# Patient Record
Sex: FEMALE | Race: WHITE | NOT HISPANIC OR LATINO
[De-identification: names, ages, dates, MRNs, and addresses within clinical notes are randomized per-mention and may not be internally consistent; named-entity substitution may affect disease eponyms.]

---

## 2017-06-15 ENCOUNTER — APPOINTMENT (OUTPATIENT)
Dept: PULMONOLOGY | Facility: CLINIC | Age: 72
End: 2017-06-15

## 2017-06-15 VITALS
HEART RATE: 67 BPM | DIASTOLIC BLOOD PRESSURE: 72 MMHG | OXYGEN SATURATION: 97 % | SYSTOLIC BLOOD PRESSURE: 160 MMHG | WEIGHT: 168 LBS | HEIGHT: 60 IN | BODY MASS INDEX: 32.98 KG/M2

## 2017-07-07 ENCOUNTER — APPOINTMENT (OUTPATIENT)
Dept: OPHTHALMOLOGY | Facility: CLINIC | Age: 72
End: 2017-07-07

## 2017-11-14 ENCOUNTER — APPOINTMENT (OUTPATIENT)
Dept: OPHTHALMOLOGY | Facility: CLINIC | Age: 72
End: 2017-11-14
Payer: MEDICARE

## 2017-11-14 PROCEDURE — 92083 EXTENDED VISUAL FIELD XM: CPT

## 2017-12-14 ENCOUNTER — APPOINTMENT (OUTPATIENT)
Dept: PULMONOLOGY | Facility: CLINIC | Age: 72
End: 2017-12-14
Payer: MEDICARE

## 2017-12-14 VITALS
BODY MASS INDEX: 33.4 KG/M2 | OXYGEN SATURATION: 94 % | DIASTOLIC BLOOD PRESSURE: 70 MMHG | SYSTOLIC BLOOD PRESSURE: 138 MMHG | HEART RATE: 73 BPM | WEIGHT: 171 LBS

## 2017-12-14 PROCEDURE — 99214 OFFICE O/P EST MOD 30 MIN: CPT

## 2018-03-09 ENCOUNTER — APPOINTMENT (OUTPATIENT)
Dept: OPHTHALMOLOGY | Facility: CLINIC | Age: 73
End: 2018-03-09
Payer: MEDICARE

## 2018-03-09 PROCEDURE — 92083 EXTENDED VISUAL FIELD XM: CPT | Mod: TC

## 2018-06-14 ENCOUNTER — APPOINTMENT (OUTPATIENT)
Dept: PULMONOLOGY | Facility: CLINIC | Age: 73
End: 2018-06-14

## 2018-06-29 ENCOUNTER — APPOINTMENT (OUTPATIENT)
Dept: PULMONOLOGY | Facility: CLINIC | Age: 73
End: 2018-06-29
Payer: MEDICARE

## 2018-06-29 VITALS
BODY MASS INDEX: 33.2 KG/M2 | WEIGHT: 170 LBS | SYSTOLIC BLOOD PRESSURE: 164 MMHG | HEART RATE: 87 BPM | DIASTOLIC BLOOD PRESSURE: 72 MMHG | OXYGEN SATURATION: 97 %

## 2018-06-29 PROCEDURE — 99214 OFFICE O/P EST MOD 30 MIN: CPT

## 2020-01-30 ENCOUNTER — APPOINTMENT (OUTPATIENT)
Dept: PULMONOLOGY | Facility: CLINIC | Age: 75
End: 2020-01-30
Payer: MEDICARE

## 2020-01-30 VITALS
SYSTOLIC BLOOD PRESSURE: 130 MMHG | WEIGHT: 170 LBS | DIASTOLIC BLOOD PRESSURE: 68 MMHG | TEMPERATURE: 98.2 F | OXYGEN SATURATION: 97 % | BODY MASS INDEX: 33.2 KG/M2 | HEART RATE: 66 BPM

## 2020-01-30 PROCEDURE — 94727 GAS DIL/WSHOT DETER LNG VOL: CPT

## 2020-01-30 PROCEDURE — 99214 OFFICE O/P EST MOD 30 MIN: CPT | Mod: 25

## 2020-01-30 PROCEDURE — 94729 DIFFUSING CAPACITY: CPT

## 2020-01-30 PROCEDURE — 94060 EVALUATION OF WHEEZING: CPT

## 2020-02-08 NOTE — HISTORY OF PRESENT ILLNESS
[TextBox_4] : 73 yo female with hx of mild OAD last seen 1 1/2 year ago, presents for follow up. The patient complains of PRN SOB but denies cough, chest pain or hemoptysis. She has not used any inhaled meds for "a long time". She had  recent cardiac evaluation, which she states was "normal".

## 2020-02-08 NOTE — PHYSICAL EXAM
[Normal Oropharynx] : normal oropharynx [No Acute Distress] : no acute distress [Normal Appearance] : normal appearance [No Neck Mass] : no neck mass [Normal Rate/Rhythm] : normal rate/rhythm [Normal S1, S2] : normal s1, s2 [No Resp Distress] : no resp distress [No Murmurs] : no murmurs [Clear to Auscultation Bilaterally] : clear to auscultation bilaterally [Benign] : benign [No Abnormalities] : no abnormalities [No Cyanosis] : no cyanosis [Normal Gait] : normal gait [No Clubbing] : no clubbing [FROM] : FROM [No Edema] : no edema [Normal Color/ Pigmentation] : normal color/ pigmentation [No Focal Deficits] : no focal deficits [Oriented x3] : oriented x3 [Normal Affect] : normal affect

## 2020-02-08 NOTE — DISCUSSION/SUMMARY
[FreeTextEntry1] : 73 yo female with mild OAD, with recent increase in related symptoms. She was given a sample of symbicort 80/4.5 BID to use transiently. She is to continue with ORN albuterol MDI. Treatment adjustment will depend on symptomatic needs. She is to follow up with her PMD as before.

## 2021-03-18 ENCOUNTER — APPOINTMENT (OUTPATIENT)
Dept: PULMONOLOGY | Facility: CLINIC | Age: 76
End: 2021-03-18
Payer: MEDICARE

## 2021-03-18 VITALS
TEMPERATURE: 99.3 F | SYSTOLIC BLOOD PRESSURE: 144 MMHG | HEART RATE: 41 BPM | DIASTOLIC BLOOD PRESSURE: 66 MMHG | WEIGHT: 174 LBS | OXYGEN SATURATION: 99 % | BODY MASS INDEX: 33.98 KG/M2

## 2021-03-18 PROCEDURE — 99213 OFFICE O/P EST LOW 20 MIN: CPT

## 2021-03-18 NOTE — HISTORY OF PRESENT ILLNESS
[Never] : never [TextBox_4] : 74 yo female with hx of OAD, last seen over one year ago, presents complaining of LEHMAN for two months. She denies cough, chest pain or hemoptysis. Recent cardiac evaluation was "normal" as per patient. The patient uses albuterol MDI PRN alone.She is upset over the recent passing of a family member.  [TextBox_29] : Denies snoring, daytime somnolence, apneic episodes, AM headaches

## 2021-03-18 NOTE — REVIEW OF SYSTEMS
[SOB on Exertion] : sob on exertion [Negative] : Endocrine [Cough] : no cough [Sputum] : no sputum [Dyspnea] : no dyspnea

## 2021-03-18 NOTE — DISCUSSION/SUMMARY
[FreeTextEntry1] : 76 yo female with OAD complaining of recent LEHMAN. She was given a sample of trelegy to use daily for two weeks with continued albuterol MDI use. Treatment adjustment will depend on symptomatic needs. PFT will be repeated in the future.Diet, weight loss and exercise stressed. She is to follow up with her PMD as before.

## 2021-04-12 ENCOUNTER — APPOINTMENT (OUTPATIENT)
Dept: DISASTER EMERGENCY | Facility: OTHER | Age: 76
End: 2021-04-12
Payer: MEDICARE

## 2021-04-12 PROCEDURE — 0012A: CPT

## 2021-05-22 ENCOUNTER — INPATIENT (INPATIENT)
Facility: HOSPITAL | Age: 76
LOS: 1 days | Discharge: HOME | DRG: 244 | End: 2021-05-24
Attending: INTERNAL MEDICINE | Admitting: INTERNAL MEDICINE
Payer: MEDICARE

## 2021-05-22 VITALS
TEMPERATURE: 98 F | DIASTOLIC BLOOD PRESSURE: 62 MMHG | HEART RATE: 37 BPM | OXYGEN SATURATION: 96 % | RESPIRATION RATE: 20 BRPM | HEIGHT: 63 IN | WEIGHT: 164.91 LBS | SYSTOLIC BLOOD PRESSURE: 174 MMHG

## 2021-05-22 DIAGNOSIS — R00.1 BRADYCARDIA, UNSPECIFIED: ICD-10-CM

## 2021-05-22 LAB
ALBUMIN SERPL ELPH-MCNC: 4.2 G/DL — SIGNIFICANT CHANGE UP (ref 3.3–5)
ALP SERPL-CCNC: 72 U/L — SIGNIFICANT CHANGE UP (ref 40–120)
ALT FLD-CCNC: 17 U/L — SIGNIFICANT CHANGE UP (ref 10–45)
ANION GAP SERPL CALC-SCNC: 14 MMOL/L — SIGNIFICANT CHANGE UP (ref 5–17)
APPEARANCE UR: CLEAR — SIGNIFICANT CHANGE UP
APTT BLD: 27.2 SEC — LOW (ref 27.5–35.5)
AST SERPL-CCNC: 12 U/L — SIGNIFICANT CHANGE UP (ref 10–40)
BACTERIA # UR AUTO: NEGATIVE — SIGNIFICANT CHANGE UP
BASOPHILS # BLD AUTO: 0.03 K/UL — SIGNIFICANT CHANGE UP (ref 0–0.2)
BASOPHILS NFR BLD AUTO: 0.2 % — SIGNIFICANT CHANGE UP (ref 0–2)
BILIRUB SERPL-MCNC: 0.5 MG/DL — SIGNIFICANT CHANGE UP (ref 0.2–1.2)
BILIRUB UR-MCNC: NEGATIVE — SIGNIFICANT CHANGE UP
BLD GP AB SCN SERPL QL: NEGATIVE — SIGNIFICANT CHANGE UP
BUN SERPL-MCNC: 18 MG/DL — SIGNIFICANT CHANGE UP (ref 7–23)
CALCIUM SERPL-MCNC: 9.9 MG/DL — SIGNIFICANT CHANGE UP (ref 8.4–10.5)
CHLORIDE SERPL-SCNC: 100 MMOL/L — SIGNIFICANT CHANGE UP (ref 96–108)
CK MB CFR SERPL CALC: 1.6 NG/ML — SIGNIFICANT CHANGE UP (ref 0–3.8)
CK SERPL-CCNC: 43 U/L — SIGNIFICANT CHANGE UP (ref 25–170)
CO2 SERPL-SCNC: 27 MMOL/L — SIGNIFICANT CHANGE UP (ref 22–31)
COLOR SPEC: SIGNIFICANT CHANGE UP
CREAT SERPL-MCNC: 0.69 MG/DL — SIGNIFICANT CHANGE UP (ref 0.5–1.3)
DIFF PNL FLD: NEGATIVE — SIGNIFICANT CHANGE UP
EOSINOPHIL # BLD AUTO: 0.08 K/UL — SIGNIFICANT CHANGE UP (ref 0–0.5)
EOSINOPHIL NFR BLD AUTO: 0.6 % — SIGNIFICANT CHANGE UP (ref 0–6)
EPI CELLS # UR: 1 /HPF — SIGNIFICANT CHANGE UP
GAS PNL BLDV: SIGNIFICANT CHANGE UP
GLUCOSE SERPL-MCNC: 92 MG/DL — SIGNIFICANT CHANGE UP (ref 70–99)
GLUCOSE UR QL: NEGATIVE — SIGNIFICANT CHANGE UP
HCT VFR BLD CALC: 47.3 % — HIGH (ref 34.5–45)
HGB BLD-MCNC: 14.9 G/DL — SIGNIFICANT CHANGE UP (ref 11.5–15.5)
HYALINE CASTS # UR AUTO: 0 /LPF — SIGNIFICANT CHANGE UP (ref 0–7)
IMM GRANULOCYTES NFR BLD AUTO: 1 % — SIGNIFICANT CHANGE UP (ref 0–1.5)
INR BLD: 1.09 RATIO — SIGNIFICANT CHANGE UP (ref 0.88–1.16)
KETONES UR-MCNC: NEGATIVE — SIGNIFICANT CHANGE UP
LEUKOCYTE ESTERASE UR-ACNC: NEGATIVE — SIGNIFICANT CHANGE UP
LYMPHOCYTES # BLD AUTO: 2.54 K/UL — SIGNIFICANT CHANGE UP (ref 1–3.3)
LYMPHOCYTES # BLD AUTO: 20.3 % — SIGNIFICANT CHANGE UP (ref 13–44)
MAGNESIUM SERPL-MCNC: 2.1 MG/DL — SIGNIFICANT CHANGE UP (ref 1.6–2.6)
MCHC RBC-ENTMCNC: 25.5 PG — LOW (ref 27–34)
MCHC RBC-ENTMCNC: 31.5 GM/DL — LOW (ref 32–36)
MCV RBC AUTO: 81 FL — SIGNIFICANT CHANGE UP (ref 80–100)
MONOCYTES # BLD AUTO: 0.99 K/UL — HIGH (ref 0–0.9)
MONOCYTES NFR BLD AUTO: 7.9 % — SIGNIFICANT CHANGE UP (ref 2–14)
NEUTROPHILS # BLD AUTO: 8.76 K/UL — HIGH (ref 1.8–7.4)
NEUTROPHILS NFR BLD AUTO: 70 % — SIGNIFICANT CHANGE UP (ref 43–77)
NITRITE UR-MCNC: NEGATIVE — SIGNIFICANT CHANGE UP
NRBC # BLD: 0 /100 WBCS — SIGNIFICANT CHANGE UP (ref 0–0)
NT-PROBNP SERPL-SCNC: 253 PG/ML — SIGNIFICANT CHANGE UP (ref 0–300)
PH UR: 6.5 — SIGNIFICANT CHANGE UP (ref 5–8)
PLATELET # BLD AUTO: 281 K/UL — SIGNIFICANT CHANGE UP (ref 150–400)
POTASSIUM SERPL-MCNC: 3.2 MMOL/L — LOW (ref 3.5–5.3)
POTASSIUM SERPL-SCNC: 3.2 MMOL/L — LOW (ref 3.5–5.3)
PROT SERPL-MCNC: 7.7 G/DL — SIGNIFICANT CHANGE UP (ref 6–8.3)
PROT UR-MCNC: ABNORMAL
PROTHROM AB SERPL-ACNC: 13 SEC — SIGNIFICANT CHANGE UP (ref 10.6–13.6)
RBC # BLD: 5.84 M/UL — HIGH (ref 3.8–5.2)
RBC # FLD: 15.7 % — HIGH (ref 10.3–14.5)
RBC CASTS # UR COMP ASSIST: 0 /HPF — SIGNIFICANT CHANGE UP (ref 0–4)
RH IG SCN BLD-IMP: POSITIVE — SIGNIFICANT CHANGE UP
RH IG SCN BLD-IMP: POSITIVE — SIGNIFICANT CHANGE UP
SARS-COV-2 RNA SPEC QL NAA+PROBE: SIGNIFICANT CHANGE UP
SODIUM SERPL-SCNC: 141 MMOL/L — SIGNIFICANT CHANGE UP (ref 135–145)
SP GR SPEC: 1.01 — LOW (ref 1.01–1.02)
TROPONIN T, HIGH SENSITIVITY RESULT: 15 NG/L — SIGNIFICANT CHANGE UP (ref 0–51)
TROPONIN T, HIGH SENSITIVITY RESULT: 15 NG/L — SIGNIFICANT CHANGE UP (ref 0–51)
TSH SERPL-MCNC: 2.43 UIU/ML — SIGNIFICANT CHANGE UP (ref 0.27–4.2)
UROBILINOGEN FLD QL: NEGATIVE — SIGNIFICANT CHANGE UP
WBC # BLD: 12.52 K/UL — HIGH (ref 3.8–10.5)
WBC # FLD AUTO: 12.52 K/UL — HIGH (ref 3.8–10.5)
WBC UR QL: 3 /HPF — SIGNIFICANT CHANGE UP (ref 0–5)

## 2021-05-22 PROCEDURE — 99285 EMERGENCY DEPT VISIT HI MDM: CPT | Mod: CS,GC

## 2021-05-22 PROCEDURE — 71045 X-RAY EXAM CHEST 1 VIEW: CPT | Mod: 26

## 2021-05-22 RX ORDER — VALSARTAN 80 MG/1
160 TABLET ORAL DAILY
Refills: 0 | Status: DISCONTINUED | OUTPATIENT
Start: 2021-05-22 | End: 2021-05-24

## 2021-05-22 RX ORDER — HEPARIN SODIUM 5000 [USP'U]/ML
5000 INJECTION INTRAVENOUS; SUBCUTANEOUS EVERY 12 HOURS
Refills: 0 | Status: DISCONTINUED | OUTPATIENT
Start: 2021-05-22 | End: 2021-05-24

## 2021-05-22 RX ORDER — AMLODIPINE BESYLATE 2.5 MG/1
5 TABLET ORAL DAILY
Refills: 0 | Status: DISCONTINUED | OUTPATIENT
Start: 2021-05-22 | End: 2021-05-24

## 2021-05-22 RX ORDER — POTASSIUM CHLORIDE 20 MEQ
40 PACKET (EA) ORAL ONCE
Refills: 0 | Status: COMPLETED | OUTPATIENT
Start: 2021-05-22 | End: 2021-05-22

## 2021-05-22 RX ORDER — SIMVASTATIN 20 MG/1
20 TABLET, FILM COATED ORAL AT BEDTIME
Refills: 0 | Status: DISCONTINUED | OUTPATIENT
Start: 2021-05-22 | End: 2021-05-24

## 2021-05-22 RX ADMIN — Medication 40 MILLIEQUIVALENT(S): at 18:17

## 2021-05-22 RX ADMIN — SIMVASTATIN 20 MILLIGRAM(S): 20 TABLET, FILM COATED ORAL at 21:45

## 2021-05-22 NOTE — ED PROVIDER NOTE - NS ED ROS FT
GENERAL: +weakness, syncope No fever or chills, EYES: no change in vision, HEENT: no trouble swallowing or speaking, CARDIAC: +bradycardia, no chest pain, PULMONARY: no cough or SOB, GI: no abdominal pain, no nausea, no vomiting, no diarrhea or constipation, : No changes in urination, SKIN: no rashes, NEURO: no headache,  MSK: No joint pain ~Shade Julio M.D. Resident

## 2021-05-22 NOTE — ED ADULT NURSE NOTE - CHIEF COMPLAINT QUOTE
Metoprolol was increased by Cardiologist Dr. Warren Marie from 50mg to 75mg on 3/15  Pt had fainted a few times, has been feeling dizzy/weak, saw Cardiologist Monday and changed to 25mg  Saw PCP Nikki today and sent to ED for symptomatic bradycardia and admit to [facility] for pacemaker eval.

## 2021-05-22 NOTE — ED ADULT NURSE REASSESSMENT NOTE - NS ED NURSE REASSESS COMMENT FT1
Patient resting comfortably. Granddaughter at bedside. Patient denies chest pain, denies shortness of breath. Patient on pacing pads per MD. Patient is admitted and RTM is clicked.

## 2021-05-22 NOTE — ED ADULT TRIAGE NOTE - CHIEF COMPLAINT QUOTE
Metoprolol was increased by Cardiologist Dr. Warren Marie from 50mg to 75mg on 3/15  Pt had fainted a few times, has been feeling dizzy/weak, saw Cardiologist Monday and changed to 25mg  Saw PCP Nikki today and sent to ED for symptomatic bradycardia and admit to [facility] for pacemaker eval.

## 2021-05-22 NOTE — ED PROVIDER NOTE - ATTENDING CONTRIBUTION TO CARE
Attending MD Jayleen Johnson:  I personally have seen and examined this patient.  Resident note reviewed and agree on plan of care and except where noted.  See HPI, PE, and MDM for details.

## 2021-05-22 NOTE — H&P ADULT - HISTORY OF PRESENT ILLNESS
CHIEF COMPLAINT:Patient is a 75y old  Female who presents with a chief complaint of     HPI:  76 y/o female with multiple medical issues presenting with concern for symptomatic bradycardia. upon arrival pt bradycardiac likely type 2 block. placed on pads. comfortable when  not movitng. is on metoprolol and took today. no fevers or chills.  pt with hx of htn, ?on metoprolol 25 mg daily  was seen today in the office with bradycardia and  was sent to Er.  in ER pt was told to have mobitz2 heart block.    PAST MEDICAL & SURGICAL HISTORY:      MEDICATIONS  (STANDING):  noted    MEDICATIONS  (PRN):      FAMILY HISTORY:      SOCIAL HISTORY:    [x ] Non-smoker  [ ] Smoker  [ ] Alcohol    Allergies    No Known Allergies    Intolerances    	    REVIEW OF SYSTEMS:  CONSTITUTIONAL: No fever, weight loss, or fatigue  EYES: No eye pain, visual disturbances, or discharge  ENT:  No difficulty hearing, tinnitus, vertigo; No sinus or throat pain  NECK: No pain or stiffness  RESPIRATORY: No cough, wheezing, chills or hemoptysis; No Shortness of Breath  CARDIOVASCULAR: No chest pain, palpitations, passing out, dizziness, or leg swelling  GASTROINTESTINAL: No abdominal or epigastric pain. No nausea, vomiting, or hematemesis; No diarrhea or constipation. No melena or hematochezia.  GENITOURINARY: No dysuria, frequency, hematuria, or incontinence  NEUROLOGICAL: No headaches, memory loss, loss of strength, numbness, or tremors, +dizzy  SKIN: No itching, burning, rashes, or lesions   LYMPH Nodes: No enlarged glands  ENDOCRINE: No heat or cold intolerance; No hair loss  MUSCULOSKELETAL: No joint pain or swelling; No muscle, back, or extremity pain  PSYCHIATRIC: No depression, anxiety, mood swings, or difficulty sleeping  HEME/LYMPH: No easy bruising, or bleeding gums  ALLERGY AND IMMUNOLOGIC: No hives or eczema	    [ ] All others negative	  [ ] Unable to obtain    PHYSICAL EXAM:  T(C): 36.8 (05-22-21 @ 16:53), Max: 36.8 (05-22-21 @ 16:53)  HR: 37 (05-22-21 @ 16:53) (37 - 37)  BP: 174/62 (05-22-21 @ 16:53) (174/62 - 174/62)  RR: 20 (05-22-21 @ 16:53) (20 - 20)  SpO2: 96% (05-22-21 @ 16:53) (96% - 96%)  Wt(kg): --  I&O's Summary      Appearance: Normal	  HEENT:   Normal oral mucosa, PERRL, EOMI	  Lymphatic: No lymphadenopathy  Cardiovascular: Normal S1 S2, No JVD, + murmurs, No edema  Respiratory: Lungs clear to auscultation	  Psychiatry: A & O x 3, Mood & affect appropriate  Gastrointestinal:  Soft, Non-tender, + BS	  Skin: No rashes, No ecchymoses, No cyanosis	  Neurologic: Non-focal  Extremities: Normal range of motion, No clubbing, cyanosis or edema  Vascular: Peripheral pulses palpable 2+ bilaterally    TELEMETRY: 	    ECG:  	  RADIOLOGY:  OTHER: 	  	  LABS:	 	    CARDIAC MARKERS:                proBNP:   Lipid Profile:   HgA1c:   TSH:       PREVIOUS DIAGNOSTIC TESTING:      < from: CT Head w/o Cont (01.18.15 @ 17:14) >  IMPRESSION: Right frontoparietal extra calvarial soft tissue swelling. No   depressed calvarialfracture.    No acute intracranial hemorrhage, mass effect, or CT evidence of acute   territorial infarct.    < end of copied text >

## 2021-05-22 NOTE — ED PROVIDER NOTE - CLINICAL SUMMARY MEDICAL DECISION MAKING FREE TEXT BOX
Attending Jayleen Johnson: 74 y/o female with multiple medical issues presenting with concern for symptomatic bradycardia. upon arrival pt bradycardiac likely type 2 block. placed on pads. comfortable when  not movitng. is on metoprolol and took today. no fevers or chills. will d/w Dr rehman as pt sent in to see him. monitor rate and check extended electrolytes. pt not on digixon.

## 2021-05-22 NOTE — ED PROVIDER NOTE - OBJECTIVE STATEMENT
75yF accompanied by grand daughter h/o HTN, Arrhythmia presents at request of PCP, Dr. Doug Plummer with intermittent weakness, bradycardia and syncope of 2-3 months duration. Patient metoprolol was switched from 50mgs to 75mgs in March. Dosage was changed to 25mgs last Monday after syncopal episode. No fever, chest pain, abd pain, shortness of breath, nausea, vomiting.

## 2021-05-22 NOTE — ED PROVIDER NOTE - PHYSICAL EXAMINATION
Attending Jayleen Johnson: Gen: NAD, heent: atrauamtic, eomi, perrla, mmm, op pink, uvula midline, neck; nttp, no nuchal rigidity, chest: nttp, no crepitus, cv:  bradycardic, lungs: ctab, abd: soft, nontender, nondistended, no peritoneal signs, +BS, no guarding, ext: wwp, skin: no rash, neuro: awake and alert, following commands, speech clear, sensation and strength intact, no focal deficits

## 2021-05-22 NOTE — ED ADULT NURSE NOTE - OBJECTIVE STATEMENT
76 y/o F with pmhx of HTN, presents to the ED with symptomatic bradycardia and generalized weakness, Pt's Metoprolol was increased by Cardiologist Dr. Warren Marie from 50mg to 75mg on 3/15 after which pt had fainted a few times, has been feeling dizzy/weak, saw Cardiologist Monday and changed to 25mg then saw PCP Nikki today and sent to ED for symptomatic bradycardia and [facility]. Pt is a&ox4 on arrival, denies dizziness at this time. HR 40. Denies CP/SOB, placed on CCM and transcutaneous pacer pads. Lungs CTA, abd soft, nt/nd. Skin intact, no edema noted. ADITI without difficulty.

## 2021-05-22 NOTE — H&P ADULT - ASSESSMENT
74 y/o female with multiple medical issues presenting with concern for symptomatic bradycardia. upon arrival pt bradycardiac likely type 2 block. placed on pads. comfortable when  not movitng. is on metoprolol and took today. no fevers or chills.  pt with hx of htn, ?on metoprolol 25 mg daily  was seen today in the office with bradycardia and  was sent to Er.  in ER pt was told to have mobitz2 heart block.  admit to tele   hold beta blocker  tsh  lipid  asa daily  cardiac enzymes  echo  will adjust bp meds  ?need of PPm 76 y/o female with multiple medical issues presenting with concern for symptomatic bradycardia. upon arrival pt bradycardiac likely type 2 block. placed on pads. comfortable when  not movitng. is on metoprolol and took today. no fevers or chills.  pt with hx of htn, ?on metoprolol 25 mg daily  was seen today in the office with bradycardia and  was sent to Er.  in ER pt was told to have mobitz2 heart block.  admit to tele   hold beta blocker  tsh  lipid  asa daily  cardiac enzymes  echo  will adjust bp meds  pt had a echo and stress test which was done recently and was negative with EF of 60  pt had a monitor while on metoprolol low dose with Mobitz 1 and hr of 290 bpm  pt now in  2/1 heart block  was taking metoprolol er 25 mg daily  need of PPm

## 2021-05-23 LAB
ALBUMIN SERPL ELPH-MCNC: 3.4 G/DL — SIGNIFICANT CHANGE UP (ref 3.3–5)
ALP SERPL-CCNC: 58 U/L — SIGNIFICANT CHANGE UP (ref 40–120)
ALT FLD-CCNC: 15 U/L — SIGNIFICANT CHANGE UP (ref 10–45)
ANION GAP SERPL CALC-SCNC: 14 MMOL/L — SIGNIFICANT CHANGE UP (ref 5–17)
AST SERPL-CCNC: 10 U/L — SIGNIFICANT CHANGE UP (ref 10–40)
BILIRUB SERPL-MCNC: 0.5 MG/DL — SIGNIFICANT CHANGE UP (ref 0.2–1.2)
BUN SERPL-MCNC: 17 MG/DL — SIGNIFICANT CHANGE UP (ref 7–23)
CALCIUM SERPL-MCNC: 9.2 MG/DL — SIGNIFICANT CHANGE UP (ref 8.4–10.5)
CHLORIDE SERPL-SCNC: 103 MMOL/L — SIGNIFICANT CHANGE UP (ref 96–108)
CO2 SERPL-SCNC: 24 MMOL/L — SIGNIFICANT CHANGE UP (ref 22–31)
COVID-19 SPIKE DOMAIN AB INTERP: POSITIVE
COVID-19 SPIKE DOMAIN ANTIBODY RESULT: >250 U/ML — HIGH
CREAT SERPL-MCNC: 0.57 MG/DL — SIGNIFICANT CHANGE UP (ref 0.5–1.3)
GLUCOSE SERPL-MCNC: 97 MG/DL — SIGNIFICANT CHANGE UP (ref 70–99)
HCT VFR BLD CALC: 42.2 % — SIGNIFICANT CHANGE UP (ref 34.5–45)
HCV AB S/CO SERPL IA: 0.12 S/CO — SIGNIFICANT CHANGE UP (ref 0–0.99)
HCV AB SERPL-IMP: SIGNIFICANT CHANGE UP
HGB BLD-MCNC: 13.2 G/DL — SIGNIFICANT CHANGE UP (ref 11.5–15.5)
MCHC RBC-ENTMCNC: 25.2 PG — LOW (ref 27–34)
MCHC RBC-ENTMCNC: 31.3 GM/DL — LOW (ref 32–36)
MCV RBC AUTO: 80.7 FL — SIGNIFICANT CHANGE UP (ref 80–100)
NRBC # BLD: 0 /100 WBCS — SIGNIFICANT CHANGE UP (ref 0–0)
PLATELET # BLD AUTO: 266 K/UL — SIGNIFICANT CHANGE UP (ref 150–400)
POTASSIUM SERPL-MCNC: 3.4 MMOL/L — LOW (ref 3.5–5.3)
POTASSIUM SERPL-SCNC: 3.4 MMOL/L — LOW (ref 3.5–5.3)
PROT SERPL-MCNC: 6.4 G/DL — SIGNIFICANT CHANGE UP (ref 6–8.3)
RBC # BLD: 5.23 M/UL — HIGH (ref 3.8–5.2)
RBC # FLD: 15.8 % — HIGH (ref 10.3–14.5)
SARS-COV-2 IGG+IGM SERPL QL IA: >250 U/ML — HIGH
SARS-COV-2 IGG+IGM SERPL QL IA: POSITIVE
SODIUM SERPL-SCNC: 141 MMOL/L — SIGNIFICANT CHANGE UP (ref 135–145)
TSH SERPL-MCNC: 2.07 UIU/ML — SIGNIFICANT CHANGE UP (ref 0.27–4.2)
WBC # BLD: 10.33 K/UL — SIGNIFICANT CHANGE UP (ref 3.8–10.5)
WBC # FLD AUTO: 10.33 K/UL — SIGNIFICANT CHANGE UP (ref 3.8–10.5)

## 2021-05-23 PROCEDURE — 71045 X-RAY EXAM CHEST 1 VIEW: CPT | Mod: 26

## 2021-05-23 PROCEDURE — 93010 ELECTROCARDIOGRAM REPORT: CPT

## 2021-05-23 RX ORDER — METOPROLOL TARTRATE 50 MG
50 TABLET ORAL DAILY
Refills: 0 | Status: DISCONTINUED | OUTPATIENT
Start: 2021-05-23 | End: 2021-05-24

## 2021-05-23 RX ORDER — CEFAZOLIN SODIUM 1 G
1000 VIAL (EA) INJECTION EVERY 8 HOURS
Refills: 0 | Status: COMPLETED | OUTPATIENT
Start: 2021-05-23 | End: 2021-05-24

## 2021-05-23 RX ORDER — ACETAMINOPHEN 500 MG
650 TABLET ORAL ONCE
Refills: 0 | Status: COMPLETED | OUTPATIENT
Start: 2021-05-23 | End: 2021-05-23

## 2021-05-23 RX ORDER — ATROPINE SULFATE 0.1 MG/ML
1 SYRINGE (ML) INJECTION ONCE
Refills: 0 | Status: DISCONTINUED | OUTPATIENT
Start: 2021-05-23 | End: 2021-05-24

## 2021-05-23 RX ORDER — POTASSIUM CHLORIDE 20 MEQ
40 PACKET (EA) ORAL ONCE
Refills: 0 | Status: COMPLETED | OUTPATIENT
Start: 2021-05-23 | End: 2021-05-23

## 2021-05-23 RX ADMIN — AMLODIPINE BESYLATE 5 MILLIGRAM(S): 2.5 TABLET ORAL at 04:38

## 2021-05-23 RX ADMIN — VALSARTAN 160 MILLIGRAM(S): 80 TABLET ORAL at 04:38

## 2021-05-23 RX ADMIN — Medication 50 MILLIGRAM(S): at 11:50

## 2021-05-23 RX ADMIN — Medication 650 MILLIGRAM(S): at 21:49

## 2021-05-23 RX ADMIN — Medication 40 MILLIEQUIVALENT(S): at 17:26

## 2021-05-23 RX ADMIN — SIMVASTATIN 20 MILLIGRAM(S): 20 TABLET, FILM COATED ORAL at 20:43

## 2021-05-23 RX ADMIN — Medication 650 MILLIGRAM(S): at 20:43

## 2021-05-23 RX ADMIN — Medication 100 MILLIGRAM(S): at 17:26

## 2021-05-23 RX ADMIN — HEPARIN SODIUM 5000 UNIT(S): 5000 INJECTION INTRAVENOUS; SUBCUTANEOUS at 04:37

## 2021-05-23 NOTE — CHART NOTE - NSCHARTNOTEFT_GEN_A_CORE
still with 2/1 heart block down to 20  consent obtained  dual chamber ppm implanted  [employer]  excellent parametres

## 2021-05-24 ENCOUNTER — TRANSCRIPTION ENCOUNTER (OUTPATIENT)
Age: 76
End: 2021-05-24

## 2021-05-24 VITALS
SYSTOLIC BLOOD PRESSURE: 166 MMHG | DIASTOLIC BLOOD PRESSURE: 76 MMHG | OXYGEN SATURATION: 96 % | TEMPERATURE: 98 F | RESPIRATION RATE: 18 BRPM | HEART RATE: 60 BPM

## 2021-05-24 LAB
ANION GAP SERPL CALC-SCNC: 11 MMOL/L — SIGNIFICANT CHANGE UP (ref 5–17)
BASOPHILS # BLD AUTO: 0.02 K/UL — SIGNIFICANT CHANGE UP (ref 0–0.2)
BASOPHILS NFR BLD AUTO: 0.2 % — SIGNIFICANT CHANGE UP (ref 0–2)
BUN SERPL-MCNC: 16 MG/DL — SIGNIFICANT CHANGE UP (ref 7–23)
CALCIUM SERPL-MCNC: 9.1 MG/DL — SIGNIFICANT CHANGE UP (ref 8.4–10.5)
CHLORIDE SERPL-SCNC: 106 MMOL/L — SIGNIFICANT CHANGE UP (ref 96–108)
CO2 SERPL-SCNC: 25 MMOL/L — SIGNIFICANT CHANGE UP (ref 22–31)
CREAT SERPL-MCNC: 0.62 MG/DL — SIGNIFICANT CHANGE UP (ref 0.5–1.3)
EOSINOPHIL # BLD AUTO: 0.12 K/UL — SIGNIFICANT CHANGE UP (ref 0–0.5)
EOSINOPHIL NFR BLD AUTO: 1.2 % — SIGNIFICANT CHANGE UP (ref 0–6)
GLUCOSE SERPL-MCNC: 90 MG/DL — SIGNIFICANT CHANGE UP (ref 70–99)
HCT VFR BLD CALC: 43.8 % — SIGNIFICANT CHANGE UP (ref 34.5–45)
HGB BLD-MCNC: 13.4 G/DL — SIGNIFICANT CHANGE UP (ref 11.5–15.5)
IMM GRANULOCYTES NFR BLD AUTO: 0.3 % — SIGNIFICANT CHANGE UP (ref 0–1.5)
LYMPHOCYTES # BLD AUTO: 2.67 K/UL — SIGNIFICANT CHANGE UP (ref 1–3.3)
LYMPHOCYTES # BLD AUTO: 27.3 % — SIGNIFICANT CHANGE UP (ref 13–44)
MCHC RBC-ENTMCNC: 25 PG — LOW (ref 27–34)
MCHC RBC-ENTMCNC: 30.6 GM/DL — LOW (ref 32–36)
MCV RBC AUTO: 81.6 FL — SIGNIFICANT CHANGE UP (ref 80–100)
MONOCYTES # BLD AUTO: 0.9 K/UL — SIGNIFICANT CHANGE UP (ref 0–0.9)
MONOCYTES NFR BLD AUTO: 9.2 % — SIGNIFICANT CHANGE UP (ref 2–14)
NEUTROPHILS # BLD AUTO: 6.03 K/UL — SIGNIFICANT CHANGE UP (ref 1.8–7.4)
NEUTROPHILS NFR BLD AUTO: 61.8 % — SIGNIFICANT CHANGE UP (ref 43–77)
NRBC # BLD: 0 /100 WBCS — SIGNIFICANT CHANGE UP (ref 0–0)
PLATELET # BLD AUTO: 246 K/UL — SIGNIFICANT CHANGE UP (ref 150–400)
POTASSIUM SERPL-MCNC: 3.7 MMOL/L — SIGNIFICANT CHANGE UP (ref 3.5–5.3)
POTASSIUM SERPL-SCNC: 3.7 MMOL/L — SIGNIFICANT CHANGE UP (ref 3.5–5.3)
RBC # BLD: 5.37 M/UL — HIGH (ref 3.8–5.2)
RBC # FLD: 15.8 % — HIGH (ref 10.3–14.5)
SODIUM SERPL-SCNC: 142 MMOL/L — SIGNIFICANT CHANGE UP (ref 135–145)
WBC # BLD: 9.77 K/UL — SIGNIFICANT CHANGE UP (ref 3.8–10.5)
WBC # FLD AUTO: 9.77 K/UL — SIGNIFICANT CHANGE UP (ref 3.8–10.5)

## 2021-05-24 PROCEDURE — 86850 RBC ANTIBODY SCREEN: CPT

## 2021-05-24 PROCEDURE — 86901 BLOOD TYPING SEROLOGIC RH(D): CPT

## 2021-05-24 PROCEDURE — 33208 INSRT HEART PM ATRIAL & VENT: CPT

## 2021-05-24 PROCEDURE — 84443 ASSAY THYROID STIM HORMONE: CPT

## 2021-05-24 PROCEDURE — C1785: CPT

## 2021-05-24 PROCEDURE — 85027 COMPLETE CBC AUTOMATED: CPT

## 2021-05-24 PROCEDURE — 85018 HEMOGLOBIN: CPT

## 2021-05-24 PROCEDURE — 99285 EMERGENCY DEPT VISIT HI MDM: CPT

## 2021-05-24 PROCEDURE — 83880 ASSAY OF NATRIURETIC PEPTIDE: CPT

## 2021-05-24 PROCEDURE — 86769 SARS-COV-2 COVID-19 ANTIBODY: CPT

## 2021-05-24 PROCEDURE — 84132 ASSAY OF SERUM POTASSIUM: CPT

## 2021-05-24 PROCEDURE — C1898: CPT

## 2021-05-24 PROCEDURE — 80053 COMPREHEN METABOLIC PANEL: CPT

## 2021-05-24 PROCEDURE — 82803 BLOOD GASES ANY COMBINATION: CPT

## 2021-05-24 PROCEDURE — 86803 HEPATITIS C AB TEST: CPT

## 2021-05-24 PROCEDURE — 82947 ASSAY GLUCOSE BLOOD QUANT: CPT

## 2021-05-24 PROCEDURE — 71045 X-RAY EXAM CHEST 1 VIEW: CPT

## 2021-05-24 PROCEDURE — 81001 URINALYSIS AUTO W/SCOPE: CPT

## 2021-05-24 PROCEDURE — 83735 ASSAY OF MAGNESIUM: CPT

## 2021-05-24 PROCEDURE — 82330 ASSAY OF CALCIUM: CPT

## 2021-05-24 PROCEDURE — 84295 ASSAY OF SERUM SODIUM: CPT

## 2021-05-24 PROCEDURE — U0003: CPT

## 2021-05-24 PROCEDURE — 85014 HEMATOCRIT: CPT

## 2021-05-24 PROCEDURE — 82435 ASSAY OF BLOOD CHLORIDE: CPT

## 2021-05-24 PROCEDURE — 82565 ASSAY OF CREATININE: CPT

## 2021-05-24 PROCEDURE — 86900 BLOOD TYPING SEROLOGIC ABO: CPT

## 2021-05-24 PROCEDURE — 83605 ASSAY OF LACTIC ACID: CPT

## 2021-05-24 PROCEDURE — C1894: CPT

## 2021-05-24 PROCEDURE — 85025 COMPLETE CBC W/AUTO DIFF WBC: CPT

## 2021-05-24 PROCEDURE — C1892: CPT

## 2021-05-24 PROCEDURE — 80048 BASIC METABOLIC PNL TOTAL CA: CPT

## 2021-05-24 PROCEDURE — 82553 CREATINE MB FRACTION: CPT

## 2021-05-24 PROCEDURE — 85610 PROTHROMBIN TIME: CPT

## 2021-05-24 PROCEDURE — 93005 ELECTROCARDIOGRAM TRACING: CPT

## 2021-05-24 PROCEDURE — 82550 ASSAY OF CK (CPK): CPT

## 2021-05-24 PROCEDURE — 85730 THROMBOPLASTIN TIME PARTIAL: CPT

## 2021-05-24 PROCEDURE — 84484 ASSAY OF TROPONIN QUANT: CPT

## 2021-05-24 RX ORDER — METOPROLOL TARTRATE 50 MG
1 TABLET ORAL
Qty: 0 | Refills: 0 | DISCHARGE

## 2021-05-24 RX ORDER — FLUOXETINE HCL 10 MG
1 CAPSULE ORAL
Qty: 0 | Refills: 0 | DISCHARGE

## 2021-05-24 RX ORDER — AMLODIPINE BESYLATE 2.5 MG/1
1 TABLET ORAL
Qty: 0 | Refills: 0 | DISCHARGE

## 2021-05-24 RX ORDER — ASPIRIN/CALCIUM CARB/MAGNESIUM 324 MG
1 TABLET ORAL
Qty: 0 | Refills: 0 | DISCHARGE

## 2021-05-24 RX ORDER — IRBESARTAN AND HYDROCHLOROTHIAZIDE 12.5; 3 MG/1; MG/1
1 TABLET ORAL
Qty: 0 | Refills: 0 | DISCHARGE

## 2021-05-24 RX ORDER — CHOLECALCIFEROL (VITAMIN D3) 125 MCG
0 CAPSULE ORAL
Qty: 0 | Refills: 0 | DISCHARGE

## 2021-05-24 RX ORDER — METOPROLOL TARTRATE 50 MG
1 TABLET ORAL
Qty: 30 | Refills: 0
Start: 2021-05-24 | End: 2021-06-22

## 2021-05-24 RX ORDER — SIMVASTATIN 20 MG/1
1 TABLET, FILM COATED ORAL
Qty: 0 | Refills: 0 | DISCHARGE

## 2021-05-24 RX ADMIN — VALSARTAN 160 MILLIGRAM(S): 80 TABLET ORAL at 05:23

## 2021-05-24 RX ADMIN — Medication 100 MILLIGRAM(S): at 00:20

## 2021-05-24 RX ADMIN — Medication 50 MILLIGRAM(S): at 05:23

## 2021-05-24 RX ADMIN — AMLODIPINE BESYLATE 5 MILLIGRAM(S): 2.5 TABLET ORAL at 05:23

## 2021-05-24 NOTE — DISCHARGE NOTE PROVIDER - CARE PROVIDER_API CALL
Ari Mtz  CARDIOVASCULAR DISEASE  [address], [address]  [place] [address]  Phone: [phone]  Fax: [phone]  Follow Up Time:     Doug Plummer (MD)  Family Medicine  [address], [address] [place] [address]  Phone: [phone]  Fax: [phone]  Follow Up Time:

## 2021-05-24 NOTE — DISCHARGE NOTE NURSING/CASE MANAGEMENT/SOCIAL WORK - PATIENT PORTAL LINK FT
You can access the FollowMyHealth Patient Portal offered by [facility] by registering at the following website: [email or web address]. By joining [employer]’s FollowMyHealth portal, you will also be able to view your health information using other applications (apps) compatible with our system.

## 2021-05-24 NOTE — DISCHARGE NOTE PROVIDER - NSDCCPCAREPLAN_GEN_ALL_CORE_FT
PRINCIPAL DISCHARGE DIAGNOSIS  Diagnosis: Bradycardia  Assessment and Plan of Treatment: You had a dual chamber [employer] pacemaker placed on 5/23/21  Follow up with Dr. Mtz in 1 week  Continue your medications as directed  call your doctor if you feel dizzy, lightheaded, shortness of breath, confused, more tired, faint  you will follow up with your pacemaker doctor regularly to check your pacemaker  your pacemaker battery usually will last 5 - 8 years  avoid certain electric or magnetic equipment  if you cannot walk through metal detector, ask for hand security search  most of the time you will not be able to have MRI  you make want to get a emergency medical bracelet telling peoply you have a pacemaker  tell any health care provider that you have a pacemaker

## 2021-05-24 NOTE — DISCHARGE NOTE PROVIDER - NSDCMRMEDTOKEN_GEN_ALL_CORE_FT
amLODIPine 5 mg oral tablet: 1 tab(s) orally once a day  aspirin 81 mg oral delayed release tablet: 1 tab(s) orally once a day  FLUoxetine 60 mg oral tablet: 1 tab(s) orally once a day (in the morning)  irbesartan-hydrochlorothiazide 300mg-12.5mg oral tablet: 1 tab(s) orally once a day  Metoprolol Succinate ER 25 mg oral tablet, extended release: 1 tab(s) orally once a day  simvastatin 20 mg oral tablet: 1 tab(s) orally once a day (at bedtime)  Vitamin D3:    amLODIPine 5 mg oral tablet: 1 tab(s) orally once a day  aspirin 81 mg oral delayed release tablet: 1 tab(s) orally once a day  FLUoxetine 60 mg oral tablet: 1 tab(s) orally once a day (in the morning)  irbesartan-hydrochlorothiazide 300mg-12.5mg oral tablet: 1 tab(s) orally once a day  metoprolol succinate 50 mg oral tablet, extended release: 1 tab(s) orally once a day  simvastatin 20 mg oral tablet: 1 tab(s) orally once a day (at bedtime)  Vitamin D3:

## 2021-05-24 NOTE — PROGRESS NOTE ADULT - SUBJECTIVE AND OBJECTIVE BOX
CARDIOLOGY     PROGRESS  NOTE   ________________________________________________    CHIEF COMPLAINT:Patient is a 75y old  Female who presents with a chief complaint of syncope (23 May 2021 11:19)  doing well.  	  REVIEW OF SYSTEMS:  CONSTITUTIONAL: No fever, weight loss, or fatigue  EYES: No eye pain, visual disturbances, or discharge  ENT:  No difficulty hearing, tinnitus, vertigo; No sinus or throat pain  NECK: No pain or stiffness  RESPIRATORY: No cough, wheezing, chills or hemoptysis; No Shortness of Breath  CARDIOVASCULAR: No chest pain, palpitations, passing out, dizziness, or leg swelling  GASTROINTESTINAL: No abdominal or epigastric pain. No nausea, vomiting, or hematemesis; No diarrhea or constipation. No melena or hematochezia.  GENITOURINARY: No dysuria, frequency, hematuria, or incontinence  NEUROLOGICAL: No headaches, memory loss, loss of strength, numbness, or tremors  SKIN: No itching, burning, rashes, or lesions   LYMPH Nodes: No enlarged glands  ENDOCRINE: No heat or cold intolerance; No hair loss  MUSCULOSKELETAL: No joint pain or swelling; No muscle, back, or extremity pain  PSYCHIATRIC: No depression, anxiety, mood swings, or difficulty sleeping  HEME/LYMPH: No easy bruising, or bleeding gums  ALLERGY AND IMMUNOLOGIC: No hives or eczema	    [ ] All others negative	  [ ] Unable to obtain    PHYSICAL EXAM:  T(C): 36.8 (05-24-21 @ 05:09), Max: 37.1 (05-23-21 @ 20:49)  HR: 60 (05-24-21 @ 05:09) (60 - 70)  BP: 162/85 (05-24-21 @ 05:09) (119/73 - 179/69)  RR: 18 (05-24-21 @ 05:09) (18 - 19)  SpO2: 97% (05-24-21 @ 05:09) (95% - 97%)  Wt(kg): --  I&O's Summary    23 May 2021 07:01  -  24 May 2021 07:00  --------------------------------------------------------  IN: 240 mL / OUT: 0 mL / NET: 240 mL        Appearance: Normal	  HEENT:   Normal oral mucosa, PERRL, EOMI	  Lymphatic: No lymphadenopathy  Cardiovascular: Normal S1 S2, No JVD, No murmurs, No edema  Respiratory: Lungs clear to auscultation	  Psychiatry: A & O x 3, Mood & affect appropriate  Gastrointestinal:  Soft, Non-tender, + BS	  Skin: No rashes, No ecchymoses, No cyanosis	  Neurologic: Non-focal  Extremities: Normal range of motion, No clubbing, cyanosis or edema  Vascular: Peripheral pulses palpable 2+ bilaterally    MEDICATIONS  (STANDING):  amLODIPine   Tablet 5 milliGRAM(s) Oral daily  atropine Injectable 1 milliGRAM(s) IntraMuscular once  heparin   Injectable 5000 Unit(s) SubCutaneous every 12 hours  metoprolol succinate ER 50 milliGRAM(s) Oral daily  simvastatin 20 milliGRAM(s) Oral at bedtime  valsartan 160 milliGRAM(s) Oral daily      TELEMETRY: 	    ECG:  	  RADIOLOGY:  OTHER: 	  	  LABS:	 	    CARDIAC MARKERS:  CARDIAC MARKERS ( 22 May 2021 19:21 )  x     / x     / 43 U/L / x     / 1.6 ng/mL                                13.4   9.77  )-----------( 246      ( 24 May 2021 06:41 )             43.8     05-24    142  |  106  |  16  ----------------------------<  90  3.7   |  25  |  0.62    Ca    9.1      24 May 2021 06:39  Mg     2.1     05-22    TPro  6.4  /  Alb  3.4  /  TBili  0.5  /  DBili  x   /  AST  10  /  ALT  15  /  AlkPhos  58  05-23    proBNP: Serum Pro-Brain Natriuretic Peptide: 253 pg/mL (05-22 @ 17:24)    Lipid Profile:   HgA1c:   TSH: Thyroid Stimulating Hormone, Serum: 2.07 uIU/mL (05-23 @ 01:02)  Thyroid Stimulating Hormone, Serum: 2.43 uIU/mL (05-22 @ 20:47)    PT/INR - ( 22 May 2021 17:33 )   PT: 13.0 sec;   INR: 1.09 ratio         PTT - ( 22 May 2021 17:33 )  PTT:27.2 sec    < from: Xray Chest 1 View- PORTABLE-Urgent (05.23.21 @ 11:47) >  Left chest wall dual-lead pacemaker with electrode tips in appropriate position.  Borderline heart size.  Clear lungs.  No pleural effusion or pneumothorax.        Assessment and plan  ---------------------------  74 y/o female with multiple medical issues presenting with concern for symptomatic bradycardia. upon arrival pt bradycardiac likely type 2 block. placed on pads. comfortable when  not movitng. is on metoprolol and took today. no fevers or chills.  pt with hx of htn, ?on metoprolol 25 mg daily  was seen today in the office with bradycardia and  was sent to Er.  in ER pt was told to have mobitz2 heart block.  admit to tele   hold beta blocker  tsh  lipid  asa daily  cardiac enzymes  echo  will adjust bp meds  pt had a echo and stress test which was done recently and was negative with EF of 60  pt had a monitor while on metoprolol low dose with Mobitz 1 and hr of 29bpm  s/p ppm   dc home today    	        
           CARDIOLOGY     PROGRESS  NOTE   ________________________________________________    CHIEF COMPLAINT:Patient is a 75y old  Female who presents with a chief complaint of syncope (22 May 2021 17:34)  heart block 2/1  	  REVIEW OF SYSTEMS:  CONSTITUTIONAL: No fever, weight loss, or fatigue  EYES: No eye pain, visual disturbances, or discharge  ENT:  No difficulty hearing, tinnitus, vertigo; No sinus or throat pain  NECK: No pain or stiffness  RESPIRATORY: No cough, wheezing, chills or hemoptysis; No Shortness of Breath  CARDIOVASCULAR: No chest pain, palpitations, passing out, dizziness, or leg swelling  GASTROINTESTINAL: No abdominal or epigastric pain. No nausea, vomiting, or hematemesis; No diarrhea or constipation. No melena or hematochezia.  GENITOURINARY: No dysuria, frequency, hematuria, or incontinence  NEUROLOGICAL: No headaches, memory loss, loss of strength, numbness, or tremors  SKIN: No itching, burning, rashes, or lesions   LYMPH Nodes: No enlarged glands  ENDOCRINE: No heat or cold intolerance; No hair loss  MUSCULOSKELETAL: No joint pain or swelling; No muscle, back, or extremity pain  PSYCHIATRIC: No depression, anxiety, mood swings, or difficulty sleeping  HEME/LYMPH: No easy bruising, or bleeding gums  ALLERGY AND IMMUNOLOGIC: No hives or eczema	    [ ] All others negative	  [ ] Unable to obtain    PHYSICAL EXAM:  T(C): 36.6 (05-23-21 @ 04:13), Max: 36.9 (05-22-21 @ 20:20)  HR: 34 (05-23-21 @ 04:13) (34 - 42)  BP: 179/64 (05-23-21 @ 04:13) (145/56 - 183/74)  RR: 18 (05-23-21 @ 04:13) (18 - 22)  SpO2: 98% (05-23-21 @ 04:13) (94% - 99%)  Wt(kg): --  I&O's Summary      Appearance: Normal	  HEENT:   Normal oral mucosa, PERRL, EOMI	  Lymphatic: No lymphadenopathy  Cardiovascular: Normal S1 S2, No JVD, +murmurs, No edema  Respiratory: Lungs clear to auscultation	  Psychiatry: A & O x 3, Mood & affect appropriate  Gastrointestinal:  Soft, Non-tender, + BS	  Skin: No rashes, No ecchymoses, No cyanosis	  Neurologic: Non-focal  Extremities: Normal range of motion, No clubbing, cyanosis or edema  Vascular: Peripheral pulses palpable 2+ bilaterally    MEDICATIONS  (STANDING):  amLODIPine   Tablet 5 milliGRAM(s) Oral daily  atropine Injectable 1 milliGRAM(s) IntraMuscular once  heparin   Injectable 5000 Unit(s) SubCutaneous every 12 hours  simvastatin 20 milliGRAM(s) Oral at bedtime  valsartan 160 milliGRAM(s) Oral daily      TELEMETRY: 	    ECG:  	  RADIOLOGY:  OTHER: 	  	  LABS:	 	    CARDIAC MARKERS:  CARDIAC MARKERS ( 22 May 2021 19:21 )  x     / x     / 43 U/L / x     / 1.6 ng/mL                                13.2   10.33 )-----------( 266      ( 23 May 2021 06:48 )             42.2     05-23    141  |  103  |  17  ----------------------------<  97  3.4<L>   |  24  |  0.57    Ca    9.2      23 May 2021 06:48  Mg     2.1     05-22    TPro  6.4  /  Alb  3.4  /  TBili  0.5  /  DBili  x   /  AST  10  /  ALT  15  /  AlkPhos  58  05-23    proBNP: Serum Pro-Brain Natriuretic Peptide: 253 pg/mL (05-22 @ 17:24)    Lipid Profile:   HgA1c:   TSH: Thyroid Stimulating Hormone, Serum: 2.07 uIU/mL (05-23 @ 01:02)  Thyroid Stimulating Hormone, Serum: 2.43 uIU/mL (05-22 @ 20:47)    PT/INR - ( 22 May 2021 17:33 )   PT: 13.0 sec;   INR: 1.09 ratio         PTT - ( 22 May 2021 17:33 )  PTT:27.2 sec      Assessment and plan  ---------------------------  74 y/o female with multiple medical issues presenting with concern for symptomatic bradycardia. upon arrival pt bradycardiac likely type 2 block. placed on pads. comfortable when  not movitng. is on metoprolol and took today. no fevers or chills.  pt with hx of htn, ?on metoprolol 25 mg daily  was seen today in the office with bradycardia and  was sent to Er.  in ER pt was told to have mobitz2 heart block.  admit to tele   hold beta blocker  tsh  lipid  asa daily  cardiac enzymes  echo  will adjust bp meds  pt had a echo and stress test which was done recently and was negative with EF of 60  pt had a monitor while on metoprolol low dose with Mobitz 1 and hr of 29bpm  still in 2/1 heart block down to 20   ppm today

## 2021-05-24 NOTE — DISCHARGE NOTE PROVIDER - PROVIDER TOKENS
1. I was told the name of the doctor(s) who took care of my child while in the hospital.    2. I have been told about any important findings on my child's physical exam and my child’s plan of care.    3. The doctor clearly explained my child's diagnosis and other possible diagnoses that were considered.    4. My child's doctor explained all the tests that were done and their results (if available). I understand that some of the test results may not be ready before we go home and I was told how I can get these results. I understand that a summary of my child's hospitalization and important test results will be shared with my child's outpatient doctor.    5. My child's doctor talked to me about what I need to do when we go home.    6. I understand what signs and symptoms to watch for. I understand what symptoms I would need to call my doctor for and/or return to the hospital.    7. I have the phone number to call the hospital for results and/or questions after I leave the hospital.
PROVIDER:[TOKEN:[[id number]]],PROVIDER:[TOKEN:[[id number]]]

## 2021-05-24 NOTE — DISCHARGE NOTE PROVIDER - HOSPITAL COURSE
76 y/o female with multiple medical issues presenting with concern for symptomatic bradycardia. upon arrival pt bradycardiac likely type 2 block. placed on pads.   in ER pt was told to have mobitz 2 heart block. She is s/p [employer] dual chamber ppm on 5/23.   admit to tele   hold beta blocker  tsh  lipid  asa daily  cardiac enzymes  echo  will adjust bp meds  pt had a echo and stress test which was done recently and was negative with EF of 60  pt had a monitor while on metoprolol low dose with Mobitz 1 and hr of 29bpm  s/p ppm   dc home today     74 y/o female with multiple medical issues presenting with concern for symptomatic bradycardia. upon arrival pt bradycardiac likely type 2 block. placed on pads.   in ER pt was told to have mobitz 2 heart block. She is s/p [employer] dual chamber ppm on 5/23.  Blood pressure meds adjusted. Patient received Moderna vaccine x 2 doses as outpatient.     DCP with medication reconciliation discussed with Dr. Mtz.   Patient cleared for discharge home without skilled needs.   Follow up with PCP and cardiologist in 1 week.

## 2021-06-17 ENCOUNTER — APPOINTMENT (OUTPATIENT)
Dept: PULMONOLOGY | Facility: CLINIC | Age: 76
End: 2021-06-17
Payer: MEDICARE

## 2021-06-17 VITALS
TEMPERATURE: 96 F | SYSTOLIC BLOOD PRESSURE: 128 MMHG | DIASTOLIC BLOOD PRESSURE: 80 MMHG | HEIGHT: 60 IN | OXYGEN SATURATION: 93 % | HEART RATE: 66 BPM | RESPIRATION RATE: 16 BRPM | BODY MASS INDEX: 33.57 KG/M2 | WEIGHT: 171 LBS

## 2021-06-17 PROBLEM — I10 ESSENTIAL (PRIMARY) HYPERTENSION: Chronic | Status: ACTIVE | Noted: 2021-05-22

## 2021-06-17 PROCEDURE — 99213 OFFICE O/P EST LOW 20 MIN: CPT

## 2021-06-17 NOTE — DISCUSSION/SUMMARY
[FreeTextEntry1] : 77 yo female with stable mild OAD for which she is to continue albuterol MDI use as before. Treatment adjustment will depend on symptomatic needs.She is to follow up with her cardiologist and PMD as before.

## 2021-06-17 NOTE — HISTORY OF PRESENT ILLNESS
[Never] : never [TextBox_4] : 75 yo female with hx of  mild OAD presents for follow up. The patient is "OK" with PRN albuterol MDI use alone. She denies cough or chest pain. She had recent PPM insertion at [facility] following  weakness with multiple syncopal episodes.She has gotten the influenza vaccine. [TextBox_29] : Denies snoring, daytime somnolence, apneic episodes, AM headaches

## 2021-12-02 ENCOUNTER — APPOINTMENT (OUTPATIENT)
Dept: OPHTHALMOLOGY | Facility: CLINIC | Age: 76
End: 2021-12-02

## 2021-12-16 ENCOUNTER — APPOINTMENT (OUTPATIENT)
Dept: PULMONOLOGY | Facility: CLINIC | Age: 76
End: 2021-12-16
Payer: MEDICARE

## 2021-12-16 VITALS
WEIGHT: 166 LBS | BODY MASS INDEX: 32.42 KG/M2 | OXYGEN SATURATION: 98 % | SYSTOLIC BLOOD PRESSURE: 174 MMHG | DIASTOLIC BLOOD PRESSURE: 84 MMHG | TEMPERATURE: 96 F | HEART RATE: 70 BPM

## 2021-12-16 DIAGNOSIS — R06.02 SHORTNESS OF BREATH: ICD-10-CM

## 2021-12-16 DIAGNOSIS — Z95.0 PRESENCE OF CARDIAC PACEMAKER: ICD-10-CM

## 2021-12-16 DIAGNOSIS — J45.909 UNSPECIFIED ASTHMA, UNCOMPLICATED: ICD-10-CM

## 2021-12-16 PROCEDURE — 99214 OFFICE O/P EST MOD 30 MIN: CPT

## 2022-01-08 PROBLEM — Z95.0 PRESENCE OF PERMANENT CARDIAC PACEMAKER: Status: ACTIVE | Noted: 2021-06-17

## 2022-01-08 NOTE — HISTORY OF PRESENT ILLNESS
[Never] : never [TextBox_4] : 77 yo female with hx of OAD presents for follow up. The patient feels "OK" with PRN albuterol MDI use . She denies cough or chest pain. She was recently seen by her cardiologist who apparently had patient perform spirometry in his office, told her lungs were "weak"! PPM appears to be OK also. [TextBox_29] : Denies snoring, daytime somnolence, apneic episodes, AM headaches

## 2022-01-08 NOTE — DISCUSSION/SUMMARY
[FreeTextEntry1] : 75 yo female with mild OAD at baseline on PRN albuterol MDI. Treatment adjustment will depend on symptomatic needs. Optimization of cardiac function as per her cardiologist. PFT with diffusion will be performed in the future. She is to follow up with her PMD as before.

## 2022-01-08 NOTE — REVIEW OF SYSTEMS
[Cough] : no cough [Sputum] : no sputum [Dyspnea] : no dyspnea [SOB on Exertion] : no sob on exertion [Negative] : Endocrine

## 2022-01-26 DIAGNOSIS — Z72.89 OTHER PROBLEMS RELATED TO LIFESTYLE: ICD-10-CM

## 2022-01-26 DIAGNOSIS — Z98.890 OTHER SPECIFIED POSTPROCEDURAL STATES: ICD-10-CM

## 2022-01-26 DIAGNOSIS — Z80.3 FAMILY HISTORY OF MALIGNANT NEOPLASM OF BREAST: ICD-10-CM

## 2022-01-26 DIAGNOSIS — Z63.4 DISAPPEARANCE AND DEATH OF FAMILY MEMBER: ICD-10-CM

## 2022-01-26 DIAGNOSIS — Z80.0 FAMILY HISTORY OF MALIGNANT NEOPLASM OF DIGESTIVE ORGANS: ICD-10-CM

## 2022-01-26 SDOH — SOCIAL STABILITY - SOCIAL INSECURITY: DISSAPEARANCE AND DEATH OF FAMILY MEMBER: Z63.4

## 2022-01-27 ENCOUNTER — APPOINTMENT (OUTPATIENT)
Dept: OBGYN | Facility: CLINIC | Age: 77
End: 2022-01-27
Payer: MEDICARE

## 2022-01-27 DIAGNOSIS — N93.9 ABNORMAL UTERINE AND VAGINAL BLEEDING, UNSPECIFIED: ICD-10-CM

## 2022-01-27 DIAGNOSIS — Z95.0 PRESENCE OF CARDIAC PACEMAKER: ICD-10-CM

## 2022-01-27 DIAGNOSIS — N95.2 POSTMENOPAUSAL ATROPHIC VAGINITIS: ICD-10-CM

## 2022-01-27 PROCEDURE — 99213 OFFICE O/P EST LOW 20 MIN: CPT

## 2022-01-30 PROBLEM — N95.2 ATROPHY OF VAGINA: Status: ACTIVE | Noted: 2022-01-30

## 2022-01-30 LAB
APPEARANCE: CLEAR
BACTERIA: NEGATIVE
BILIRUBIN URINE: NEGATIVE
BLOOD URINE: NEGATIVE
COLOR: NORMAL
GLUCOSE QUALITATIVE U: NEGATIVE
HYALINE CASTS: 0 /LPF
KETONES URINE: NEGATIVE
LEUKOCYTE ESTERASE URINE: ABNORMAL
MICROSCOPIC-UA: NORMAL
NITRITE URINE: NEGATIVE
PH URINE: 6.5
PROTEIN URINE: NEGATIVE
RED BLOOD CELLS URINE: 1 /HPF
SPECIFIC GRAVITY URINE: 1.01
SQUAMOUS EPITHELIAL CELLS: 1 /HPF
UROBILINOGEN URINE: NORMAL
WHITE BLOOD CELLS URINE: 2 /HPF

## 2022-01-31 RX ORDER — METOPROLOL SUCCINATE 25 MG/1
25 TABLET, EXTENDED RELEASE ORAL
Qty: 90 | Refills: 0 | Status: ACTIVE | COMMUNITY
Start: 2021-06-12

## 2022-01-31 NOTE — DISCUSSION/SUMMARY
[FreeTextEntry1] : 77 y/o female presents with her grandaughter for evauation of an episode of seeing blood upon wiping after urination on her toilet paper\par pt did not see any blood in the toilet bowl\par pt did not experience any urinary or pelvic pain\par on exam patient has extreme vaginal atrophy\par vaginal and pelvic exam c/w atrophy\par full d/w pt and grandaughter\par pt to [facility] for a pelvic sonogram\par UA and CX performed\par pt counselled that I feel her symptoms could be do to her marked atrophy- pending results of her pelvic sonogram and urine cx

## 2022-01-31 NOTE — PHYSICAL EXAM
[Chaperone Declined] : Patient declined chaperone [Appropriately responsive] : appropriately responsive [Alert] : alert [No Acute Distress] : no acute distress [Soft] : soft [Non-tender] : non-tender [Non-distended] : non-distended [Vulvar Atrophy] : vulvar atrophy [Labia Majora] : normal [Labia Minora] : normal [Atrophy] : atrophy [Normal] : normal [Uterine Adnexae] : normal [FreeTextEntry3] : Unremarkable appearing external urethral meatus\par  [FreeTextEntry4] : marked atrophy at introitus

## 2022-01-31 NOTE — HISTORY OF PRESENT ILLNESS
[FreeTextEntry1] : 77 y/o . Here with granddaughter ( Issac). Declines .\par -Reports after using BR  wiped and saw pink staining on tissue. Also happened 2 times in December. No prior episodes. Denies frequency, urgency or pelvic pressure. Denies staining to under garments. Hx hemorrhoids. Denies PMB. pt states that she did use very rough toilet paper\par -20 TVS fibroid 4.81cm,\par - [facility] Bx.\par FHx sister breast cancer 42. Pt has not had BRCA testing [Mammogramdate] : 1/28/21 [TextBox_19] : BR2 [BreastSonogramDate] : 1/28/21 [TextBox_25] : BR2 [PapSmeardate] : 6/13/19  [TextBox_31] : NEG [BoneDensityDate] : 2017 [ColonoscopyDate] : 2016 [HPVDate] : 6/13/19 [TextBox_78] : NEG

## 2022-02-03 DIAGNOSIS — N39.0 URINARY TRACT INFECTION, SITE NOT SPECIFIED: ICD-10-CM

## 2022-02-03 RX ORDER — SULFAMETHOXAZOLE AND TRIMETHOPRIM 800; 160 MG/1; MG/1
800-160 TABLET ORAL TWICE DAILY
Qty: 14 | Refills: 0 | Status: ACTIVE | COMMUNITY
Start: 2022-02-03 | End: 1900-01-01

## 2022-02-03 RX ORDER — CIPROFLOXACIN HYDROCHLORIDE 250 MG/1
250 TABLET, FILM COATED ORAL
Qty: 10 | Refills: 0 | Status: DISCONTINUED | COMMUNITY
Start: 2022-01-31 | End: 2022-02-03

## 2022-02-03 RX ORDER — ASPIRIN 81 MG
81 TABLET, DELAYED RELEASE (ENTERIC COATED) ORAL
Refills: 0 | Status: ACTIVE | COMMUNITY

## 2022-02-18 DIAGNOSIS — D25.9 LEIOMYOMA OF UTERUS, UNSPECIFIED: ICD-10-CM

## 2022-02-22 ENCOUNTER — APPOINTMENT (OUTPATIENT)
Dept: OBGYN | Facility: CLINIC | Age: 77
End: 2022-02-22

## 2022-06-09 ENCOUNTER — APPOINTMENT (OUTPATIENT)
Dept: PULMONOLOGY | Facility: CLINIC | Age: 77
End: 2022-06-09

## 2022-06-09 VITALS
SYSTOLIC BLOOD PRESSURE: 156 MMHG | HEART RATE: 68 BPM | WEIGHT: 168 LBS | OXYGEN SATURATION: 97 % | TEMPERATURE: 97.8 F | BODY MASS INDEX: 32.81 KG/M2 | DIASTOLIC BLOOD PRESSURE: 80 MMHG

## 2022-06-09 PROCEDURE — 99214 OFFICE O/P EST MOD 30 MIN: CPT

## 2022-06-29 NOTE — DISCUSSION/SUMMARY
[FreeTextEntry1] : 76 yo female with stable mild OAD. She is to use albuterol MDI PRN. Treatment adjustment will depend on symptomatic needs. PFT will be repeated in the future.She is to follow up with her PMD as before.

## 2022-06-29 NOTE — HISTORY OF PRESENT ILLNESS
[Never] : never [TextBox_4] : 75 yo female with hx of OAD presents for follow up. The patient feels "OK" complaining of PRN LEHMAN. She denies cough or chest pain. She has not used albuterol recently. [TextBox_29] : Denies snoring, daytime somnolence, apneic episodes, AM headaches

## 2022-06-29 NOTE — REVIEW OF SYSTEMS
[Cough] : no cough [Sputum] : no sputum [Dyspnea] : no dyspnea [SOB on Exertion] : sob on exertion [Negative] : Endocrine

## 2022-12-08 ENCOUNTER — APPOINTMENT (OUTPATIENT)
Dept: PULMONOLOGY | Facility: CLINIC | Age: 77
End: 2022-12-08

## 2022-12-08 VITALS
RESPIRATION RATE: 16 BRPM | BODY MASS INDEX: 32.62 KG/M2 | OXYGEN SATURATION: 98 % | TEMPERATURE: 96 F | SYSTOLIC BLOOD PRESSURE: 180 MMHG | HEART RATE: 70 BPM | DIASTOLIC BLOOD PRESSURE: 80 MMHG | WEIGHT: 167 LBS

## 2022-12-08 DIAGNOSIS — J44.9 CHRONIC OBSTRUCTIVE PULMONARY DISEASE, UNSPECIFIED: ICD-10-CM

## 2022-12-08 DIAGNOSIS — R06.00 DYSPNEA, UNSPECIFIED: ICD-10-CM

## 2022-12-08 PROCEDURE — 99214 OFFICE O/P EST MOD 30 MIN: CPT

## 2022-12-11 PROBLEM — R06.00 DOE (DYSPNEA ON EXERTION): Status: ACTIVE | Noted: 2022-06-29

## 2022-12-11 PROBLEM — J44.9 OAD (OBSTRUCTIVE AIRWAY DISEASE): Status: ACTIVE | Noted: 2022-06-29

## 2022-12-11 NOTE — DISCUSSION/SUMMARY
[FreeTextEntry1] : 78 yo female with hx of OAD at baseline without treatment at present. Treatment adjustment will depend on symptomatic needs. PFT will be performed in the future. She is to follow up with her PMD as before and for the influenza vaccine.

## 2022-12-11 NOTE — HISTORY OF PRESENT ILLNESS
[Never] : never [TextBox_4] : 78 yo female with hx of OAD presents for follow up. The patient is "OK", complaining of PRN LEHMAN without cough or chest pain.She has not needed to use inhaled meds. [TextBox_29] : Denies snoring, daytime somnolence, apneic episodes, AM headaches

## 2023-03-08 ENCOUNTER — APPOINTMENT (OUTPATIENT)
Dept: OBGYN | Facility: CLINIC | Age: 78
End: 2023-03-08
Payer: MEDICARE

## 2023-03-08 VITALS
HEIGHT: 60 IN | BODY MASS INDEX: 33.38 KG/M2 | WEIGHT: 170 LBS | SYSTOLIC BLOOD PRESSURE: 180 MMHG | DIASTOLIC BLOOD PRESSURE: 73 MMHG | HEART RATE: 64 BPM

## 2023-03-08 DIAGNOSIS — Z01.419 ENCOUNTER FOR GYNECOLOGICAL EXAMINATION (GENERAL) (ROUTINE) W/OUT ABNORMAL FINDINGS: ICD-10-CM

## 2023-03-08 DIAGNOSIS — Z12.39 ENCOUNTER FOR OTHER SCREENING FOR MALIGNANT NEOPLASM OF BREAST: ICD-10-CM

## 2023-03-08 PROCEDURE — G0101: CPT

## 2023-03-08 NOTE — PHYSICAL EXAM
[Chaperone Present] : A chaperone was present in the examining room during all aspects of the physical examination [Appropriately responsive] : appropriately responsive [Alert] : alert [No Acute Distress] : no acute distress [No Lymphadenopathy] : no lymphadenopathy [Regular Rate Rhythm] : regular rate rhythm [Soft] : soft [Non-tender] : non-tender [Non-distended] : non-distended [No Mass] : no mass [Oriented x3] : oriented x3 [Examination Of The Breasts] : a normal appearance [No Masses] : no breast masses were palpable [Labia Majora] : normal [Labia Minora] : normal [Uterine Adnexae] : normal [Normal] : normal [Declined] : Patient declined rectal exam

## 2023-03-15 PROBLEM — Z01.419 ENCOUNTER FOR WELL WOMAN EXAM WITH ROUTINE GYNECOLOGICAL EXAM: Status: ACTIVE | Noted: 2023-03-15

## 2023-04-04 NOTE — ED ADULT TRIAGE NOTE - NS ED NURSE BANDS TYPE
Name band; Patient here to see ENT for nose sores  Gabriela Joseph LPN ..........4/4/2023 9:45 AM

## 2023-06-08 ENCOUNTER — APPOINTMENT (OUTPATIENT)
Dept: PULMONOLOGY | Facility: CLINIC | Age: 78
End: 2023-06-08
Payer: MEDICARE

## 2023-06-08 VITALS
WEIGHT: 163 LBS | BODY MASS INDEX: 31.83 KG/M2 | TEMPERATURE: 96.2 F | OXYGEN SATURATION: 97 % | DIASTOLIC BLOOD PRESSURE: 80 MMHG | SYSTOLIC BLOOD PRESSURE: 150 MMHG | HEART RATE: 67 BPM

## 2023-06-08 PROCEDURE — 99214 OFFICE O/P EST MOD 30 MIN: CPT

## 2023-06-08 RX ORDER — ALBUTEROL SULFATE 90 UG/1
108 (90 BASE) INHALANT RESPIRATORY (INHALATION)
Qty: 1 | Refills: 3 | Status: ACTIVE | COMMUNITY
Start: 2023-06-08 | End: 1900-01-01

## 2023-08-20 NOTE — PATIENT PROFILE ADULT - NSPROMEDSBROUGHTTOHOSP_GEN_A_NUR
Patient arrived to ED from [address] [place] [address] via EMS for new onset atrial fibrillation. Patient reports feeling lightheaded and a stiff neck since last night so she went to IC for evaluation. There, an EKG was performed that showed a-fib, patient has no hx. Of a fib. Hx of HTN    Patient is A/O x 4    Patient changed into gown. Side rails up x2, call light within reach, blanket provided.
no

## 2023-09-09 NOTE — HISTORY OF PRESENT ILLNESS
[Never] : never [TextBox_4] : 77-year-old female with history of mild OAD presents for follow-up.  The patient feels "well", without albuterol use for a while.  She denies cough chest pain or hemoptysis. [TextBox_29] : Denies snoring, daytime somnolence, apneic episodes, AM headaches

## 2023-09-09 NOTE — DISCUSSION/SUMMARY
[FreeTextEntry1] : 77-year-old female with history of mild OAD at baseline.  She is to use albuterol on an as needed basis alone.  Treatment adjust will depend on symptomatic needs.  She is to follow-up with her PMD as before.

## 2023-11-25 NOTE — REVIEW OF SYSTEMS
DISCHARGE [Negative] : Endocrine [Cough] : no cough [Sputum] : no sputum [Dyspnea] : no dyspnea [SOB on Exertion] : no sob on exertion

## 2024-08-30 NOTE — H&P ADULT - NSICDXPILOT_GEN_ALL_CORE
Thank you for allowing us to care for you today.  You may receive an electronic survey after this visit.  We appreciate your feedback so we can continually improve our services.         ~Orthopedic department    [place]

## 2024-10-31 ENCOUNTER — APPOINTMENT (OUTPATIENT)
Dept: PULMONOLOGY | Facility: CLINIC | Age: 79
End: 2024-10-31
Payer: MEDICARE

## 2024-10-31 VITALS
OXYGEN SATURATION: 97 % | TEMPERATURE: 97.1 F | WEIGHT: 170 LBS | BODY MASS INDEX: 33.2 KG/M2 | SYSTOLIC BLOOD PRESSURE: 128 MMHG | HEART RATE: 76 BPM | DIASTOLIC BLOOD PRESSURE: 62 MMHG

## 2024-10-31 PROCEDURE — 99214 OFFICE O/P EST MOD 30 MIN: CPT | Mod: 25

## 2024-10-31 PROCEDURE — 94729 DIFFUSING CAPACITY: CPT

## 2024-10-31 PROCEDURE — 94060 EVALUATION OF WHEEZING: CPT

## 2024-10-31 PROCEDURE — 94727 GAS DIL/WSHOT DETER LNG VOL: CPT

## 2024-10-31 RX ORDER — ALBUTEROL SULFATE 90 UG/1
108 (90 BASE) INHALANT RESPIRATORY (INHALATION)
Qty: 1 | Refills: 3 | Status: ACTIVE | COMMUNITY
Start: 2024-10-31 | End: 1900-01-01

## 2024-12-18 NOTE — ED PROVIDER NOTE - NS ED MD DISPO ADMITTING SERVICE
Continued Stay Note  [facility]     Patient Name: Arianna Blanco  MRN: [id number]  Today's Date: 12/18/2024    Admit Date: 12/16/2024    Plan: Return home, with son.   Discharge Plan       Row Name 12/18/24 1882       Plan    Plan Return home, with son.    Patient/Family in Agreement with Plan yes    Plan Comments [facility]. Spoke to Paz who reports pt is not current for any skilled needs (nursing or therapy). CM was then transferred to IVANNA Bustamante, who reports pt is no longer current with services d/t change to Medicaid pathways program. CM spoke to pt at bedside and she is going to her son's home in [place]. Her friend Manoj will be transporting and she declined  setting up Medicaid transportation. Pt will not have a rolling walker to use at her son's home. Order placed and Sonia with [employer] notified for processing and delivery.             Megan Naegele, RN     Office Phone: [phone]  Office Cell: [phone]      MED

## 2024-12-24 PROBLEM — F10.90 ALCOHOL USE: Status: ACTIVE | Noted: 2022-01-26

## 2025-02-20 NOTE — ED ADULT NURSE NOTE - NSHOSCREENINGQ1_ED_ALL_ED
Port flushes every 8 weeks or so    Refer to smoking cessation program    In 6 months, repeat labs and CT and see me a few days after that  
No

## 2025-05-01 ENCOUNTER — APPOINTMENT (OUTPATIENT)
Dept: PULMONOLOGY | Facility: CLINIC | Age: 80
End: 2025-05-01
Payer: MEDICARE

## 2025-05-01 VITALS
SYSTOLIC BLOOD PRESSURE: 138 MMHG | HEART RATE: 66 BPM | BODY MASS INDEX: 32.2 KG/M2 | OXYGEN SATURATION: 96 % | WEIGHT: 164 LBS | TEMPERATURE: 96.4 F | HEIGHT: 60 IN | DIASTOLIC BLOOD PRESSURE: 66 MMHG

## 2025-05-01 PROCEDURE — 99214 OFFICE O/P EST MOD 30 MIN: CPT

## 2025-07-10 ENCOUNTER — APPOINTMENT (OUTPATIENT)
Age: 80
End: 2025-07-10
Payer: MEDICARE

## 2025-07-10 ENCOUNTER — NON-APPOINTMENT (OUTPATIENT)
Age: 80
End: 2025-07-10

## 2025-07-10 PROCEDURE — 92134 CPTRZ OPH DX IMG PST SGM RTA: CPT

## 2025-07-10 PROCEDURE — 92014 COMPRE OPH EXAM EST PT 1/>: CPT

## 2025-07-10 PROCEDURE — 92202 OPSCPY EXTND ON/MAC DRAW: CPT
